# Patient Record
Sex: FEMALE | Race: OTHER | Employment: OTHER | URBAN - METROPOLITAN AREA
[De-identification: names, ages, dates, MRNs, and addresses within clinical notes are randomized per-mention and may not be internally consistent; named-entity substitution may affect disease eponyms.]

---

## 2020-02-20 ENCOUNTER — IMPORTED ENCOUNTER (OUTPATIENT)
Dept: URBAN - METROPOLITAN AREA CLINIC 50 | Facility: CLINIC | Age: 50
End: 2020-02-20

## 2020-03-09 ENCOUNTER — IMPORTED ENCOUNTER (OUTPATIENT)
Dept: URBAN - METROPOLITAN AREA CLINIC 50 | Facility: CLINIC | Age: 50
End: 2020-03-09

## 2020-06-16 ENCOUNTER — IMPORTED ENCOUNTER (OUTPATIENT)
Dept: URBAN - METROPOLITAN AREA CLINIC 50 | Facility: CLINIC | Age: 50
End: 2020-06-16

## 2021-02-16 ENCOUNTER — IMPORTED ENCOUNTER (OUTPATIENT)
Dept: URBAN - METROPOLITAN AREA CLINIC 50 | Facility: CLINIC | Age: 51
End: 2021-02-16

## 2021-02-16 NOTE — PATIENT DISCUSSION
"""Reassured patient that intraocular pressures (IOPs) are at target levels and other ocular findings are stable. No medication is needed at this time.  Return to clinic in 6 months for follow up. """

## 2021-04-18 ASSESSMENT — VISUAL ACUITY
OS_CC: J7@ 16 IN
OS_CC: 20/25
OS_CC: J1+@ 13 IN
OD_CC: J7@ 16 IN
OD_CC: J1+
OD_CC: 20/25
OD_CC: 20/20
OD_CC: J1+@ 13 IN
OS_CC: 20/20-
OD_CC: 20/20-
OS_CC: 20/20
OS_CC: J1+

## 2021-04-18 ASSESSMENT — PACHYMETRY
OD_CT_UM: 639
OS_CT_UM: 635
OD_CT_UM: 639
OD_CT_UM: 639
OS_CT_UM: 635
OD_CT_UM: 639
OS_CT_UM: 635
OS_CT_UM: 635

## 2021-04-18 ASSESSMENT — TONOMETRY
OS_IOP_MMHG: 18
OS_IOP_MMHG: 23
OS_IOP_MMHG: 22
OS_IOP_MMHG: 18
OD_IOP_MMHG: 18
OD_IOP_MMHG: 18
OS_IOP_MMHG: 22
OS_IOP_MMHG: 19
OD_IOP_MMHG: 22
OD_IOP_MMHG: 22
OD_IOP_MMHG: 18
OD_IOP_MMHG: 22

## 2021-08-12 ENCOUNTER — PREPPED CHART (OUTPATIENT)
Dept: URBAN - METROPOLITAN AREA CLINIC 53 | Facility: CLINIC | Age: 51
End: 2021-08-12

## 2021-08-12 NOTE — PATIENT DISCUSSION
"""Reassured patient that intraocular pressures (IOPs) are at target levels and other ocular findings are stable. No medication is needed at this time.  Return to clinic in 6 months for follow up. ""."

## 2021-08-17 ENCOUNTER — 6 MONTH FOLLOW-UP (OUTPATIENT)
Dept: URBAN - METROPOLITAN AREA CLINIC 53 | Facility: CLINIC | Age: 51
End: 2021-08-17

## 2021-08-17 DIAGNOSIS — H40.013: ICD-10-CM

## 2021-08-17 DIAGNOSIS — H25.13: ICD-10-CM

## 2021-08-17 PROCEDURE — 92012 INTRM OPH EXAM EST PATIENT: CPT

## 2021-08-17 ASSESSMENT — TONOMETRY
OD_IOP_MMHG: 16
OS_IOP_MMHG: 21
OS_IOP_MMHG: 17
OD_IOP_MMHG: 20

## 2021-08-17 ASSESSMENT — VISUAL ACUITY
OD_CC: 20/20
OS_CC: 20/20

## 2022-04-12 ENCOUNTER — ESTABLISHED PATIENT (OUTPATIENT)
Dept: URBAN - METROPOLITAN AREA CLINIC 53 | Facility: CLINIC | Age: 52
End: 2022-04-12

## 2022-04-12 DIAGNOSIS — H40.013: ICD-10-CM

## 2022-04-12 DIAGNOSIS — H25.13: ICD-10-CM

## 2022-04-12 PROCEDURE — 92014 COMPRE OPH EXAM EST PT 1/>: CPT

## 2022-04-12 PROCEDURE — 92015 DETERMINE REFRACTIVE STATE: CPT

## 2022-04-12 ASSESSMENT — TONOMETRY
OD_IOP_MMHG: 12
OS_IOP_MMHG: 18
OS_IOP_MMHG: 12
OD_IOP_MMHG: 18

## 2022-04-12 ASSESSMENT — VISUAL ACUITY
OU_SC: J7
OD_GLARE: 20/25
OD_SC: 20/40
OU_CC: 20/20
OS_SC: 20/50
OD_CC: 20/25
OD_GLARE: 20/30
OU_CC: J2
OU_SC: 20/50
OS_CC: 20/20-1
OS_GLARE: 20/30
OS_GLARE: 20/40

## 2022-04-12 NOTE — PATIENT DISCUSSION
The IOP is in the target range. No drop therapy recommended at this time.  patients mother has glaucoma,  recommend hvf/oct rnfl next available.

## 2022-05-12 ENCOUNTER — DIAGNOSTICS ONLY (OUTPATIENT)
Dept: URBAN - METROPOLITAN AREA CLINIC 48 | Facility: CLINIC | Age: 52
End: 2022-05-12

## 2022-05-12 DIAGNOSIS — H40.013: ICD-10-CM

## 2022-05-12 PROCEDURE — 92083 EXTENDED VISUAL FIELD XM: CPT

## 2022-05-12 PROCEDURE — 92133 CPTRZD OPH DX IMG PST SGM ON: CPT
